# Patient Record
Sex: MALE | URBAN - METROPOLITAN AREA
[De-identification: names, ages, dates, MRNs, and addresses within clinical notes are randomized per-mention and may not be internally consistent; named-entity substitution may affect disease eponyms.]

---

## 2017-05-22 ENCOUNTER — EMERGENCY (EMERGENCY)
Facility: HOSPITAL | Age: 57
LOS: 0 days | Discharge: LEFT BEFORE DISCHARGE | End: 2017-05-22
Admitting: EMERGENCY MEDICINE

## 2017-05-22 VITALS
HEIGHT: 67 IN | TEMPERATURE: 98 F | HEART RATE: 78 BPM | OXYGEN SATURATION: 100 % | SYSTOLIC BLOOD PRESSURE: 148 MMHG | DIASTOLIC BLOOD PRESSURE: 72 MMHG | RESPIRATION RATE: 18 BRPM | WEIGHT: 145.06 LBS
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
## 2017-05-24 DIAGNOSIS — M25.522 PAIN IN LEFT ELBOW: ICD-10-CM

## 2018-03-24 ENCOUNTER — EMERGENCY (EMERGENCY)
Facility: HOSPITAL | Age: 58
LOS: 1 days | Discharge: HOME | End: 2018-03-24
Attending: EMERGENCY MEDICINE
Payer: MEDICAID

## 2018-03-24 VITALS
WEIGHT: 132.06 LBS | TEMPERATURE: 98 F | HEIGHT: 66 IN | RESPIRATION RATE: 18 BRPM | OXYGEN SATURATION: 98 % | SYSTOLIC BLOOD PRESSURE: 154 MMHG | DIASTOLIC BLOOD PRESSURE: 109 MMHG | HEART RATE: 74 BPM

## 2018-03-24 VITALS — DIASTOLIC BLOOD PRESSURE: 89 MMHG | SYSTOLIC BLOOD PRESSURE: 153 MMHG

## 2018-03-24 PROCEDURE — 99281 EMR DPT VST MAYX REQ PHY/QHP: CPT

## 2018-03-24 PROCEDURE — 99283 EMERGENCY DEPT VISIT LOW MDM: CPT

## 2018-03-24 NOTE — ED STATDOCS - MEDICAL DECISION MAKING DETAILS
HERE BC ELEVATED BREATHALYZER. AWAITING CALL BACK FROM [facility] [phone] OR [facility] [phone]. THEY RUN [facility] WHERE HE LIVES AND NEEDS TO RETURN TO

## 2018-03-24 NOTE — ED STATDOCS - OBJECTIVE STATEMENT
PT SENT FROM [facility] FOR BREATHALYZER OF 0.100  NO COMPLAINTS  NO FEVER VOMITING DIARRHEA COUGH  NO SIG MED HX

## 2018-03-24 NOTE — ED ADULT TRIAGE NOTE - CHIEF COMPLAINT QUOTE
[facility], states that when he returned he he did a breathylizer and it read .1, he denies any alcohol use patient does not have any ALOOB. patient ambulating and alert and oriented x4 denies any pain, patient states that he needs clearance to return back to the facility

## 2018-03-24 NOTE — ED STATDOCS - PROGRESS NOTE DETAILS
[facility] WANTS LAB WORK THAT IS NOT CLINICALLY INDICATED. PT IS CLINICALLY SOBER. BAL AT 8:15 PM .100. CHEMICALLY HE IS NOW LESS THAN LEGAL LIMIT SINCE METABOLISM IS 20 MG/HR ON AVERAGE. LOBO DISCUSSING WITH PT. TRIXIE BAL TO CALL BACK

## 2018-08-01 ENCOUNTER — OUTPATIENT (OUTPATIENT)
Dept: OUTPATIENT SERVICES | Facility: HOSPITAL | Age: 58
LOS: 1 days | End: 2018-08-01
Payer: MEDICAID

## 2018-08-01 PROCEDURE — G9001: CPT

## 2018-08-16 DIAGNOSIS — Z71.89 OTHER SPECIFIED COUNSELING: ICD-10-CM
